# Patient Record
Sex: FEMALE | Race: WHITE | NOT HISPANIC OR LATINO | URBAN - METROPOLITAN AREA
[De-identification: names, ages, dates, MRNs, and addresses within clinical notes are randomized per-mention and may not be internally consistent; named-entity substitution may affect disease eponyms.]

---

## 2023-10-05 ENCOUNTER — HOSPITAL ENCOUNTER (EMERGENCY)
Facility: HOSPITAL | Age: 88
Discharge: STILL ADMITTED OR ADMINISTRATIVE | End: 2023-10-05

## 2023-10-05 PROCEDURE — 4500999001 HC ED NO CHARGE

## 2023-10-05 PROCEDURE — 4500999001 HC ED NO CHARGE: Performed by: EMERGENCY MEDICINE

## 2024-05-31 ENCOUNTER — NURSING HOME VISIT (OUTPATIENT)
Dept: POST ACUTE CARE | Facility: EXTERNAL LOCATION | Age: 89
End: 2024-05-31
Payer: MEDICARE

## 2024-05-31 DIAGNOSIS — I50.9 CONGESTIVE HEART FAILURE, UNSPECIFIED HF CHRONICITY, UNSPECIFIED HEART FAILURE TYPE (MULTI): Primary | ICD-10-CM

## 2024-05-31 DIAGNOSIS — R53.1 WEAKNESS: ICD-10-CM

## 2024-05-31 DIAGNOSIS — I48.91 ATRIAL FIBRILLATION, UNSPECIFIED TYPE (MULTI): ICD-10-CM

## 2024-05-31 PROCEDURE — 99308 SBSQ NF CARE LOW MDM 20: CPT | Performed by: REGISTERED NURSE

## 2024-05-31 NOTE — LETTER
Patient: Danni Tejada  : 1924    Encounter Date: 2024    PROGRESS NOTE    Subjective  Chief complaint: Danni Tejada is a 89 y.o. female who is an acute skilled patient being seen and evaluated for weakness    HPI:  24 Patient admitted to SNF for therapy d/t weakness after recent hospitalization.   Patient requires assist with ADLs and transfers.  No new complaints.      Objective  Vital signs: 130/69, 97.5, 76, 18, 98%    Physical Exam  Constitutional:       General: She is not in acute distress.  Eyes:      Extraocular Movements: Extraocular movements intact.   Pulmonary:      Effort: Pulmonary effort is normal.   Musculoskeletal:      Cervical back: Neck supple.   Neurological:      Mental Status: She is alert.   Psychiatric:         Mood and Affect: Mood normal.         Behavior: Behavior is cooperative.         Assessment/Plan  Problem List Items Addressed This Visit       CHF (congestive heart failure) (Multi) - Primary     Reduce sodium diet  Monitor for shortness of breath.  Furosemide   Monitor labs  Compression stockings            A-fib (Multi)     Monitor heart rate  Xarelto  Bleeding precautions         Weakness     Continue pt/ot           Medications, treatments, and labs reviewed  Continue medications and treatments as listed in [facility]    Scribe Attestation  I, Salima Golden   attest that this documentation has been prepared under the direction and in the presence of AMAYA Gavin.    Provider Attestation - Scribe documentation  All medical record entries made by the Scribe were at my direction and personally dictated by me. I have reviewed the chart and agree that the record accurately reflects my personal performance of the history, physical exam, discussion and plan.    AMAYA Gavin        Electronically Signed By: AMAYA Gavin   24  3:44 PM

## 2024-06-03 ENCOUNTER — NURSING HOME VISIT (OUTPATIENT)
Dept: POST ACUTE CARE | Facility: EXTERNAL LOCATION | Age: 89
End: 2024-06-03

## 2024-06-03 DIAGNOSIS — M00.812 ARTHRITIS DUE TO OTHER BACTERIA, LEFT SHOULDER (MULTI): ICD-10-CM

## 2024-06-03 DIAGNOSIS — A41.9 SEPSIS, DUE TO UNSPECIFIED ORGANISM, UNSPECIFIED WHETHER ACUTE ORGAN DYSFUNCTION PRESENT (MULTI): Primary | ICD-10-CM

## 2024-06-03 DIAGNOSIS — I50.9 CONGESTIVE HEART FAILURE, UNSPECIFIED HF CHRONICITY, UNSPECIFIED HEART FAILURE TYPE (MULTI): ICD-10-CM

## 2024-06-03 DIAGNOSIS — I48.91 ATRIAL FIBRILLATION, UNSPECIFIED TYPE (MULTI): ICD-10-CM

## 2024-06-03 DIAGNOSIS — I10 HYPERTENSION, UNSPECIFIED TYPE: ICD-10-CM

## 2024-06-03 DIAGNOSIS — E03.9 HYPOTHYROIDISM, UNSPECIFIED TYPE: ICD-10-CM

## 2024-06-03 PROBLEM — E78.5 HYPERLIPIDEMIA: Status: ACTIVE | Noted: 2024-06-03

## 2024-06-03 PROBLEM — J90 PLEURAL EFFUSION: Status: ACTIVE | Noted: 2024-06-03

## 2024-06-03 PROBLEM — R13.10 DYSPHAGIA: Status: ACTIVE | Noted: 2024-06-03

## 2024-06-03 NOTE — LETTER
Patient: Danni Tejada  : 1924    Encounter Date: 2024    HISTORY & PHYSICAL    Subjective  Chief complaint: Danni Tejada is a 89 y.o. female who is being seen and evaluated for multiple medical problems. Patient admitted to SNF for therapy due to weakness after recent hospitalization.    HPI:  HPI  Patient was admitted to the hospital and treated for septic left shoulder.  Patient was started on antibiotics and to continue outpatient.  Patient is tolerating antibiotics without adverse effects.  Therapy evaluated patient with recommendations for SNF placement and further therapy.  Patient was seen and examined at the bedside, appears to be in no acute distress.  Patient denies chest pain or shortness of breath.  Denies fever or chills.  Denies nausea or vomiting.    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, left shoulder (Multi)    • CHF (congestive heart failure) (Multi)    • Dysphagia    • Hyperlipidemia    • Hypertension    • Hypothyroidism    • Pleural effusion        Past Surgical History:   Procedure Laterality Date   • CARPAL TUNNEL RELEASE  2016    Neuroplasty Decompression Median Nerve At Carpal Tunnel       Family History   Problem Relation Name Age of Onset   • No Known Problems Mother     • No Known Problems Father         Social History     Socioeconomic History   • Marital status:      Spouse name: Not on file   • Number of children: Not on file   • Years of education: Not on file   • Highest education level: Not on file   Occupational History   • Not on file   Tobacco Use   • Smoking status: Not on file   • Smokeless tobacco: Not on file   Substance and Sexual Activity   • Alcohol use: Not on file   • Drug use: Not on file   • Sexual activity: Not on file   Other Topics Concern   • Not on file   Social History Narrative   • Not on file     Social Determinants of Health     Financial Resource Strain: Not on file   Food  Insecurity: Not on file   Transportation Needs: Not on file   Physical Activity: Not on file   Stress: Not on file   Social Connections: Not on file   Intimate Partner Violence: Not on file   Housing Stability: Not on file       Vital signs: 148/78, 70, 16, 98.0, 98%    Objective  Physical Exam  Constitutional:       General: She is not in acute distress.  Eyes:      Extraocular Movements: Extraocular movements intact.   Cardiovascular:      Rate and Rhythm: Normal rate and regular rhythm.   Pulmonary:      Effort: Pulmonary effort is normal.      Breath sounds: Normal breath sounds.   Abdominal:      General: Bowel sounds are normal.      Palpations: Abdomen is soft.   Musculoskeletal:      Cervical back: Neck supple.      Right lower leg: No edema.      Left lower leg: No edema.   Neurological:      Mental Status: She is alert.   Psychiatric:         Mood and Affect: Mood normal.         Behavior: Behavior is cooperative.         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  Furosemide         A-fib (Multi)     Monitor heart rate  Xarelto  Bleeding precautions         Hypertension     Monitor blood pressure         Hypothyroidism     Levothyroxine  Monitor TSH         Sepsis (Multi) - Primary     Continue vancomycin and ceftriaxone until complete          Hospital records reviewed  Medications, treatments, and labs reviewed  Continue medications and treatments as listed in EMR  Discussed with nursing and therapy      Scribe Attestation  I, Salima Velazquez   attest that this documentation has been prepared under the direction and in the presence of Afshin Diop MD    Provider Attestation - Scribe documentation  All medical record entries made by the Scribe were at my direction and  personally dictated by me. I have reviewed the chart and agree that the record accurately reflects my personal performance of the history, physical exam, discussion and plan.   Afshin Diop MD      Electronically Signed By: Afshin Diop MD   6/3/24  6:40 PM

## 2024-06-03 NOTE — PROGRESS NOTES
HISTORY & PHYSICAL    Subjective   Chief complaint: Danni Tejada is a 89 y.o. female who is being seen and evaluated for multiple medical problems. Patient admitted to SNF for therapy due to weakness after recent hospitalization.    HPI:  HPI  Patient was admitted to the hospital and treated for septic left shoulder.  Patient was started on antibiotics and to continue outpatient.  Patient is tolerating antibiotics without adverse effects.  Therapy evaluated patient with recommendations for SNF placement and further therapy.  Patient was seen and examined at the bedside, appears to be in no acute distress.  Patient denies chest pain or shortness of breath.  Denies fever or chills.  Denies nausea or vomiting.    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, left shoulder (Multi)     CHF (congestive heart failure) (Multi)     Dysphagia     Hyperlipidemia     Hypertension     Hypothyroidism     Pleural effusion        Past Surgical History:   Procedure Laterality Date    CARPAL TUNNEL RELEASE  06/08/2016    Neuroplasty Decompression Median Nerve At Carpal Tunnel       Family History   Problem Relation Name Age of Onset    No Known Problems Mother      No Known Problems Father         Social History     Socioeconomic History    Marital status:      Spouse name: Not on file    Number of children: Not on file    Years of education: Not on file    Highest education level: Not on file   Occupational History    Not on file   Tobacco Use    Smoking status: Not on file    Smokeless tobacco: Not on file   Substance and Sexual Activity    Alcohol use: Not on file    Drug use: Not on file    Sexual activity: Not on file   Other Topics Concern    Not on file   Social History Narrative    Not on file     Social Determinants of Health     Financial Resource Strain: Not on file   Food Insecurity: Not on file   Transportation Needs: Not on file   Physical Activity: Not on file    Stress: Not on file   Social Connections: Not on file   Intimate Partner Violence: Not on file   Housing Stability: Not on file       Vital signs: 148/78, 70, 16, 98.0, 98%    Objective   Physical Exam  Constitutional:       General: She is not in acute distress.  Eyes:      Extraocular Movements: Extraocular movements intact.   Cardiovascular:      Rate and Rhythm: Normal rate and regular rhythm.   Pulmonary:      Effort: Pulmonary effort is normal.      Breath sounds: Normal breath sounds.   Abdominal:      General: Bowel sounds are normal.      Palpations: Abdomen is soft.   Musculoskeletal:      Cervical back: Neck supple.      Right lower leg: No edema.      Left lower leg: No edema.   Neurological:      Mental Status: She is alert.   Psychiatric:         Mood and Affect: Mood normal.         Behavior: Behavior is cooperative.         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  Furosemide         A-fib (Multi)     Monitor heart rate  Xarelto  Bleeding precautions         Hypertension     Monitor blood pressure         Hypothyroidism     Levothyroxine  Monitor TSH         Sepsis (Multi) - Primary     Continue vancomycin and ceftriaxone until complete          Hospital records reviewed  Medications, treatments, and labs reviewed  Continue medications and treatments as listed in EMR  Discussed with nursing and therapy      Scribe Attestation  I, Salima Velazquez   attest that this documentation has been prepared under the direction and in the presence of Afshin Diop MD    Provider Attestation - Scribe documentation  All medical record entries made by the Scribe were at my direction and personally dictated by me. I have reviewed the chart and agree that the record accurately reflects  my personal performance of the history, physical exam, discussion and plan.   Afshin Diop MD

## 2024-06-04 ENCOUNTER — NURSING HOME VISIT (OUTPATIENT)
Dept: POST ACUTE CARE | Facility: EXTERNAL LOCATION | Age: 89
End: 2024-06-04

## 2024-06-04 DIAGNOSIS — M00.812 ARTHRITIS DUE TO OTHER BACTERIA, LEFT SHOULDER (MULTI): ICD-10-CM

## 2024-06-04 DIAGNOSIS — I50.9 CONGESTIVE HEART FAILURE, UNSPECIFIED HF CHRONICITY, UNSPECIFIED HEART FAILURE TYPE (MULTI): ICD-10-CM

## 2024-06-04 DIAGNOSIS — I10 HYPERTENSION, UNSPECIFIED TYPE: ICD-10-CM

## 2024-06-04 DIAGNOSIS — A41.9 SEPSIS, DUE TO UNSPECIFIED ORGANISM, UNSPECIFIED WHETHER ACUTE ORGAN DYSFUNCTION PRESENT (MULTI): ICD-10-CM

## 2024-06-04 DIAGNOSIS — I48.91 ATRIAL FIBRILLATION, UNSPECIFIED TYPE (MULTI): ICD-10-CM

## 2024-06-04 NOTE — PROGRESS NOTES
PROGRESS NOTE    Subjective   Chief complaint: Danni Tejada is a 89 y.o. female who is an acute skilled patient being seen and evaluated for weakness    HPI:  Patient presents for general medical care and f/u.  Patient seen and examined at bedside. HTN, denies fatigue, sob, and palpitations. Afib, denies palpitation or chest pain. CHF, no changes in weight or SOB. She continues on IV ATB therapy for septic shoulder. WBC increased from 7 to 12. H&H 9.5 & 29.1. CR 0.6.  No issues per nursing.  Patient has no acute complaints.      Objective   Vital signs: 122/74,70,99%    Physical Exam  Constitutional:       General: She is not in acute distress.  Eyes:      Extraocular Movements: Extraocular movements intact.   Cardiovascular:      Rate and Rhythm: Normal rate and regular rhythm.   Pulmonary:      Effort: Pulmonary effort is normal.      Breath sounds: Normal breath sounds.   Abdominal:      General: Bowel sounds are normal.      Palpations: Abdomen is soft.   Musculoskeletal:      Cervical back: Neck supple.      Right lower leg: No edema.      Left lower leg: No edema.   Neurological:      Mental Status: She is alert.   Psychiatric:         Mood and Affect: Mood normal.         Behavior: Behavior is cooperative.         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  Furosemide         A-fib (Multi)     Monitor heart rate  Xarelto  Bleeding precautions         Hypertension     Monitor blood pressure         Sepsis (Multi)     Continue vancomycin and ceftriaxone until complete          Medications, treatments, and labs reviewed  Continue medications and treatments as listed in EMR    Scribe Attestation  I, Salima Martinez   attest that this documentation has been prepared under the  direction and in the presence of Afshin Diop MD.     Provider Attestation - Scribe documentation  All medical record entries made by the Scribe were at my direction and personally dictated by me. I have reviewed the chart and agree that the record accurately reflects my personal performance of the history, physical exam, discussion and plan.   Afshin Diop MD

## 2024-06-04 NOTE — ASSESSMENT & PLAN NOTE
Reduce sodium diet  Monitor for shortness of breath.  Increase lasix to 80mg x 3 days then 60mg  BMP in 1 week  Continue K 10 Meq  CBC in AM  Compression stockings

## 2024-06-04 NOTE — LETTER
Patient: Danni Tejada  : 1924    Encounter Date: 2024    PROGRESS NOTE    Subjective  Chief complaint: Danni Tejada is a 89 y.o. female who is an acute skilled patient being seen and evaluated for weakness    HPI:  Patient presents for general medical care and f/u.  Patient seen and examined at bedside. HTN, denies fatigue, sob, and palpitations. Afib, denies palpitation or chest pain. CHF, no changes in weight or SOB. She continues on IV ATB therapy for septic shoulder. WBC increased from 7 to 12. H&H 9.5 & 29.1. CR 0.6.  No issues per nursing.  Patient has no acute complaints.      Objective  Vital signs: 122/74,70,99%    Physical Exam  Constitutional:       General: She is not in acute distress.  Eyes:      Extraocular Movements: Extraocular movements intact.   Cardiovascular:      Rate and Rhythm: Normal rate and regular rhythm.   Pulmonary:      Effort: Pulmonary effort is normal.      Breath sounds: Normal breath sounds.   Abdominal:      General: Bowel sounds are normal.      Palpations: Abdomen is soft.   Musculoskeletal:      Cervical back: Neck supple.      Right lower leg: No edema.      Left lower leg: No edema.   Neurological:      Mental Status: She is alert.   Psychiatric:         Mood and Affect: Mood normal.         Behavior: Behavior is cooperative.         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  Furosemide         A-fib (Multi)     Monitor heart rate  Xarelto  Bleeding precautions         Hypertension     Monitor blood pressure         Sepsis (Multi)     Continue vancomycin and ceftriaxone until complete          Medications, treatments, and labs reviewed  Continue medications and treatments as listed in EMR    Scribe Attestation  I, Laura Toro,  Scribe   attest that this documentation has been prepared under the direction and in the presence of Afshin Diop MD.     Provider Attestation - Scribe documentation  All medical record entries made by the Scribe were at my direction and personally dictated by me. I have reviewed the chart and agree that the record accurately reflects my personal performance of the history, physical exam, discussion and plan.   Afshin Diop MD      Electronically Signed By: Afshin Diop MD   6/4/24  5:13 PM

## 2024-06-06 ENCOUNTER — NURSING HOME VISIT (OUTPATIENT)
Dept: POST ACUTE CARE | Facility: EXTERNAL LOCATION | Age: 89
End: 2024-06-06

## 2024-06-06 DIAGNOSIS — A41.9 SEPSIS, DUE TO UNSPECIFIED ORGANISM, UNSPECIFIED WHETHER ACUTE ORGAN DYSFUNCTION PRESENT (MULTI): Primary | ICD-10-CM

## 2024-06-06 DIAGNOSIS — I10 HYPERTENSION, UNSPECIFIED TYPE: ICD-10-CM

## 2024-06-06 DIAGNOSIS — I48.91 ATRIAL FIBRILLATION, UNSPECIFIED TYPE (MULTI): ICD-10-CM

## 2024-06-06 DIAGNOSIS — M00.812 ARTHRITIS DUE TO OTHER BACTERIA, LEFT SHOULDER (MULTI): ICD-10-CM

## 2024-06-06 DIAGNOSIS — I50.9 CONGESTIVE HEART FAILURE, UNSPECIFIED HF CHRONICITY, UNSPECIFIED HEART FAILURE TYPE (MULTI): ICD-10-CM

## 2024-06-06 PROBLEM — R53.1 WEAKNESS: Status: ACTIVE | Noted: 2024-06-06

## 2024-06-06 NOTE — LETTER
Patient: Danni Tejada  : 1924    Encounter Date: 2024    PROGRESS NOTE    Subjective  Chief complaint: Danni Tejada is a 89 y.o. female who is an acute skilled patient being seen and evaluated for weakness    HPI:  HPI  Patient is continue to work in therapy.  Patient is working on therapeutic exercise and activities, neuromuscular education, patient education, ADLs and self-care.  Patient is working towards goals.  Patient was seen and examined at the bedside.  Patient's Lasix was increased to 60 mg daily and to have a BMP in 1 week.  Patient also started compression stockings.  Patient continues on antibiotics for sepsis, tolerating without adverse effects    Objective  Vital signs: 146/76, 75, 17, 95%    Physical Exam  Constitutional:       General: She is not in acute distress.  Eyes:      Extraocular Movements: Extraocular movements intact.   Cardiovascular:      Rate and Rhythm: Normal rate and regular rhythm.   Pulmonary:      Effort: Pulmonary effort is normal.      Breath sounds: Normal breath sounds.   Abdominal:      General: Bowel sounds are normal.      Palpations: Abdomen is soft.   Musculoskeletal:      Cervical back: Neck supple.      Right lower leg: No edema.      Left lower leg: No edema.   Neurological:      Mental Status: She is alert.      Motor: Weakness present.   Psychiatric:         Mood and Affect: Mood normal.         Behavior: Behavior is cooperative.         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  Furosemide increased  BMP in 1 week  Compression stockings           A-fib (Multi)     Monitor heart rate  Xarelto  Bleeding precautions         Hypertension     Monitor blood pressure         Sepsis (Multi) - Primary     Continue vancomycin and  ceftriaxone until complete          Medications, treatments, and labs reviewed  Continue medications and treatments as listed in EMR      Scribe Attestation  I, Salima Velazquez   attest that this documentation has been prepared under the direction and in the presence of Afshin Diop MD    Provider Attestation - Scribe documentation  All medical record entries made by the Scribe were at my direction and personally dictated by me. I have reviewed the chart and agree that the record accurately reflects my personal performance of the history, physical exam, discussion and plan.   Afshin Diop MD        Electronically Signed By: Afshin Diop MD   6/6/24  1:33 PM

## 2024-06-06 NOTE — ASSESSMENT & PLAN NOTE
Reduce sodium diet  Monitor for shortness of breath.  Furosemide increased  BMP in 1 week  Compression stockings

## 2024-06-06 NOTE — PROGRESS NOTES
PROGRESS NOTE    Subjective   Chief complaint: Danni Tejada is a 89 y.o. female who is an acute skilled patient being seen and evaluated for weakness    HPI:  HPI  Patient is continue to work in therapy.  Patient is working on therapeutic exercise and activities, neuromuscular education, patient education, ADLs and self-care.  Patient is working towards goals.  Patient was seen and examined at the bedside.  Patient's Lasix was increased to 60 mg daily and to have a BMP in 1 week.  Patient also started compression stockings.  Patient continues on antibiotics for sepsis, tolerating without adverse effects    Objective   Vital signs: 146/76, 75, 17, 95%    Physical Exam  Constitutional:       General: She is not in acute distress.  Eyes:      Extraocular Movements: Extraocular movements intact.   Cardiovascular:      Rate and Rhythm: Normal rate and regular rhythm.   Pulmonary:      Effort: Pulmonary effort is normal.      Breath sounds: Normal breath sounds.   Abdominal:      General: Bowel sounds are normal.      Palpations: Abdomen is soft.   Musculoskeletal:      Cervical back: Neck supple.      Right lower leg: No edema.      Left lower leg: No edema.   Neurological:      Mental Status: She is alert.      Motor: Weakness present.   Psychiatric:         Mood and Affect: Mood normal.         Behavior: Behavior is cooperative.         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  Furosemide increased  BMP in 1 week  Compression stockings           A-fib (Multi)     Monitor heart rate  Xarelto  Bleeding precautions         Hypertension     Monitor blood pressure         Sepsis (Multi) - Primary     Continue vancomycin and ceftriaxone until complete          Medications, treatments, and labs  reviewed  Continue medications and treatments as listed in EMR      Scribe Attestation  I, Salima Velazquez   attest that this documentation has been prepared under the direction and in the presence of Afshin Diop MD    Provider Attestation - Scribe documentation  All medical record entries made by the Scribe were at my direction and personally dictated by me. I have reviewed the chart and agree that the record accurately reflects my personal performance of the history, physical exam, discussion and plan.   Afshin Diop MD

## 2024-06-10 ENCOUNTER — NURSING HOME VISIT (OUTPATIENT)
Dept: POST ACUTE CARE | Facility: EXTERNAL LOCATION | Age: 89
End: 2024-06-10

## 2024-06-10 DIAGNOSIS — R53.1 WEAKNESS: ICD-10-CM

## 2024-06-10 DIAGNOSIS — M00.812 ARTHRITIS DUE TO OTHER BACTERIA, LEFT SHOULDER (MULTI): ICD-10-CM

## 2024-06-10 DIAGNOSIS — I50.9 CONGESTIVE HEART FAILURE, UNSPECIFIED HF CHRONICITY, UNSPECIFIED HEART FAILURE TYPE (MULTI): ICD-10-CM

## 2024-06-10 DIAGNOSIS — I10 HYPERTENSION, UNSPECIFIED TYPE: ICD-10-CM

## 2024-06-10 DIAGNOSIS — I48.91 ATRIAL FIBRILLATION, UNSPECIFIED TYPE (MULTI): ICD-10-CM

## 2024-06-10 NOTE — LETTER
Patient: Danni Tejada  : 1924    Encounter Date: 06/10/2024    PROGRESS NOTE    Subjective  Chief complaint: Danni Tejada is a 89 y.o. female who is an acute skilled patient being seen and evaluated for weakness    HPI:  Patient presents for f/u therapy and general medical care.  Patient seen and examined at beside.  Therapy has been working with the patient to improve strength, endurance, ADLs, and transfers d/t generalized weakness.  Patient has no acute concerns today.       Objective  Vital signs: 130/69,76,98%    Physical Exam  Constitutional:       General: She is not in acute distress.  Eyes:      Extraocular Movements: Extraocular movements intact.   Cardiovascular:      Rate and Rhythm: Normal rate and regular rhythm.   Pulmonary:      Effort: Pulmonary effort is normal.      Breath sounds: Normal breath sounds.   Abdominal:      General: Bowel sounds are normal.      Palpations: Abdomen is soft.   Musculoskeletal:      Cervical back: Neck supple.      Right lower leg: No edema.      Left lower leg: No edema.   Neurological:      Mental Status: She is alert.      Motor: Weakness present.   Psychiatric:         Mood and Affect: Mood normal.         Behavior: Behavior is cooperative.         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  Furosemide 60mg  Compression stockings           A-fib (Multi)     Monitor heart rate  Xarelto  Bleeding precautions         Hypertension     Monitor blood pressure         Weakness     Continue to work towards goals in therapy          Medications, treatments, and labs reviewed  Continue medications and treatments as listed in EMR    Scribe Attestation  I, Salima Martinez   attest that this  documentation has been prepared under the direction and in the presence of Afshin Diop MD.     Provider Attestation - Scribe documentation  All medical record entries made by the Scribe were at my direction and personally dictated by me. I have reviewed the chart and agree that the record accurately reflects my personal performance of the history, physical exam, discussion and plan.   Afshin Diop MD      Electronically Signed By: Afshin Diop MD   6/10/24  3:45 PM

## 2024-06-10 NOTE — PROGRESS NOTES
PROGRESS NOTE    Subjective   Chief complaint: Danni Tejada is a 89 y.o. female who is an acute skilled patient being seen and evaluated for weakness    HPI:  5/31/24 Patient admitted to SNF for therapy d/t weakness after recent hospitalization.   Patient requires assist with ADLs and transfers.  No new complaints.      Objective   Vital signs: 130/69, 97.5, 76, 18, 98%    Physical Exam  Constitutional:       General: She is not in acute distress.  Eyes:      Extraocular Movements: Extraocular movements intact.   Pulmonary:      Effort: Pulmonary effort is normal.   Musculoskeletal:      Cervical back: Neck supple.   Neurological:      Mental Status: She is alert.   Psychiatric:         Mood and Affect: Mood normal.         Behavior: Behavior is cooperative.         Assessment/Plan   Problem List Items Addressed This Visit       CHF (congestive heart failure) (Multi) - Primary     Reduce sodium diet  Monitor for shortness of breath.  Furosemide   Monitor labs  Compression stockings            A-fib (Multi)     Monitor heart rate  Xarelto  Bleeding precautions         Weakness     Continue pt/ot           Medications, treatments, and labs reviewed  Continue medications and treatments as listed in PCC    Scribe Attestation  I, Salima Golden   attest that this documentation has been prepared under the direction and in the presence of AMAYA Gavin.    Provider Attestation - Scribe documentation  All medical record entries made by the Scribe were at my direction and personally dictated by me. I have reviewed the chart and agree that the record accurately reflects my personal performance of the history, physical exam, discussion and plan.    AMAYA Gavni

## 2024-06-10 NOTE — PROGRESS NOTES
PROGRESS NOTE    Subjective   Chief complaint: Danni Tejada is a 89 y.o. female who is an acute skilled patient being seen and evaluated for weakness    HPI:  Patient presents for f/u therapy and general medical care.  Patient seen and examined at beside.  Therapy has been working with the patient to improve strength, endurance, ADLs, and transfers d/t generalized weakness.  Patient has no acute concerns today.       Objective   Vital signs: 130/69,76,98%    Physical Exam  Constitutional:       General: She is not in acute distress.  Eyes:      Extraocular Movements: Extraocular movements intact.   Cardiovascular:      Rate and Rhythm: Normal rate and regular rhythm.   Pulmonary:      Effort: Pulmonary effort is normal.      Breath sounds: Normal breath sounds.   Abdominal:      General: Bowel sounds are normal.      Palpations: Abdomen is soft.   Musculoskeletal:      Cervical back: Neck supple.      Right lower leg: No edema.      Left lower leg: No edema.   Neurological:      Mental Status: She is alert.      Motor: Weakness present.   Psychiatric:         Mood and Affect: Mood normal.         Behavior: Behavior is cooperative.         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  Furosemide 60mg  Compression stockings           A-fib (Multi)     Monitor heart rate  Xarelto  Bleeding precautions         Hypertension     Monitor blood pressure         Weakness     Continue to work towards goals in therapy          Medications, treatments, and labs reviewed  Continue medications and treatments as listed in EMR    Scribe Attestation  CELIA, Salima Martinez   attest that this documentation has been prepared under the direction and in the presence of  Afshin Diop MD.     Provider Attestation - Scribe documentation  All medical record entries made by the Scribe were at my direction and personally dictated by me. I have reviewed the chart and agree that the record accurately reflects my personal performance of the history, physical exam, discussion and plan.   Afshin Diop MD

## 2024-06-11 ENCOUNTER — NURSING HOME VISIT (OUTPATIENT)
Dept: POST ACUTE CARE | Facility: EXTERNAL LOCATION | Age: 89
End: 2024-06-11

## 2024-06-11 DIAGNOSIS — I50.9 CONGESTIVE HEART FAILURE, UNSPECIFIED HF CHRONICITY, UNSPECIFIED HEART FAILURE TYPE (MULTI): ICD-10-CM

## 2024-06-11 DIAGNOSIS — I10 HYPERTENSION, UNSPECIFIED TYPE: ICD-10-CM

## 2024-06-11 DIAGNOSIS — R53.1 WEAKNESS: ICD-10-CM

## 2024-06-11 DIAGNOSIS — I48.91 ATRIAL FIBRILLATION, UNSPECIFIED TYPE (MULTI): ICD-10-CM

## 2024-06-11 DIAGNOSIS — M00.812 ARTHRITIS DUE TO OTHER BACTERIA, LEFT SHOULDER (MULTI): ICD-10-CM

## 2024-06-11 NOTE — PROGRESS NOTES
PROGRESS NOTE    Subjective   Chief complaint: Danni Tejada is a 89 y.o. female who is an acute skilled patient being seen and evaluated for weakness    HPI:  Patient presents for f/u therapy and general medical care.  Patient seen and examined at beside.  Therapy has been working with the patient to improve strength, endurance, ADLs, and transfers d/t generalized weakness. She continues with BLE edema. States that during shower she acquired ST to LLE, drsg and treatment in place.  Patient has no acute concerns today.      Objective   Vital signs: 129/71,73,97%    Physical Exam  Constitutional:       General: She is not in acute distress.  Eyes:      Extraocular Movements: Extraocular movements intact.   Cardiovascular:      Rate and Rhythm: Normal rate and regular rhythm.   Pulmonary:      Effort: Pulmonary effort is normal.      Breath sounds: Normal breath sounds.   Abdominal:      General: Bowel sounds are normal.      Palpations: Abdomen is soft.   Musculoskeletal:      Cervical back: Neck supple.      Right lower leg: Edema present.      Left lower leg: Edema present.   Skin:     Comments: LLE ST- drsg in place   Neurological:      Mental Status: She is alert.      Motor: Weakness present.   Psychiatric:         Mood and Affect: Mood normal.         Behavior: Behavior is cooperative.         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  Furosemide 60mg  Compression stockings  Elevation  US to BLE for edema           A-fib (Multi)     Monitor heart rate  Xarelto  Bleeding precautions         Hypertension     Monitor blood pressure         Weakness     Continue to work towards goals in therapy          Medications, treatments, and labs reviewed  Continue  medications and treatments as listed in EMR    Scribe Attestation  I, Salima Martinez   attest that this documentation has been prepared under the direction and in the presence of Afshin Diop MD.     Provider Attestation - Scribe documentation  All medical record entries made by the Scribe were at my direction and personally dictated by me. I have reviewed the chart and agree that the record accurately reflects my personal performance of the history, physical exam, discussion and plan.   Afshin Diop MD

## 2024-06-11 NOTE — ASSESSMENT & PLAN NOTE
Reduce sodium diet  Monitor for shortness of breath.  Furosemide 60mg  Compression stockings  Elevation  US to BLE for edema

## 2024-06-11 NOTE — LETTER
Patient: Danni Tejada  : 1924    Encounter Date: 2024    PROGRESS NOTE    Subjective  Chief complaint: Danni Tejada is a 89 y.o. female who is an acute skilled patient being seen and evaluated for weakness    HPI:  Patient presents for f/u therapy and general medical care.  Patient seen and examined at beside.  Therapy has been working with the patient to improve strength, endurance, ADLs, and transfers d/t generalized weakness. She continues with BLE edema. States that during shower she acquired ST to LLE, drsg and treatment in place.  Patient has no acute concerns today.      Objective  Vital signs: 129/71,73,97%    Physical Exam  Constitutional:       General: She is not in acute distress.  Eyes:      Extraocular Movements: Extraocular movements intact.   Cardiovascular:      Rate and Rhythm: Normal rate and regular rhythm.   Pulmonary:      Effort: Pulmonary effort is normal.      Breath sounds: Normal breath sounds.   Abdominal:      General: Bowel sounds are normal.      Palpations: Abdomen is soft.   Musculoskeletal:      Cervical back: Neck supple.      Right lower leg: Edema present.      Left lower leg: Edema present.   Skin:     Comments: LLE ST- drsg in place   Neurological:      Mental Status: She is alert.      Motor: Weakness present.   Psychiatric:         Mood and Affect: Mood normal.         Behavior: Behavior is cooperative.         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  Furosemide 60mg  Compression stockings  Elevation  US to BLE for edema           A-fib (Multi)     Monitor heart rate  Xarelto  Bleeding precautions         Hypertension     Monitor blood pressure         Weakness     Continue to work towards goals in  therapy          Medications, treatments, and labs reviewed  Continue medications and treatments as listed in EMR    Scribe Attestation  I, Salima Martinez   attest that this documentation has been prepared under the direction and in the presence of Afshin Diop MD.     Provider Attestation - Scribe documentation  All medical record entries made by the Scribe were at my direction and personally dictated by me. I have reviewed the chart and agree that the record accurately reflects my personal performance of the history, physical exam, discussion and plan.   Afshin Diop MD      Electronically Signed By: Afshin Diop MD   6/11/24  2:28 PM

## 2024-06-13 ENCOUNTER — NURSING HOME VISIT (OUTPATIENT)
Dept: POST ACUTE CARE | Facility: EXTERNAL LOCATION | Age: 89
End: 2024-06-13

## 2024-06-13 DIAGNOSIS — I50.9 CONGESTIVE HEART FAILURE, UNSPECIFIED HF CHRONICITY, UNSPECIFIED HEART FAILURE TYPE (MULTI): ICD-10-CM

## 2024-06-13 DIAGNOSIS — M00.812 ARTHRITIS DUE TO OTHER BACTERIA, LEFT SHOULDER (MULTI): Primary | ICD-10-CM

## 2024-06-13 DIAGNOSIS — R53.1 WEAKNESS: ICD-10-CM

## 2024-06-13 DIAGNOSIS — I48.91 ATRIAL FIBRILLATION, UNSPECIFIED TYPE (MULTI): ICD-10-CM

## 2024-06-13 DIAGNOSIS — I10 HYPERTENSION, UNSPECIFIED TYPE: ICD-10-CM

## 2024-06-13 NOTE — PROGRESS NOTES
PROGRESS NOTE    Subjective   Chief complaint: Danni Tejada is a 89 y.o. female who is an acute skilled patient being seen and evaluated for weakness    HPI:  HPI  Patient does continue to work in therapy following left shoulder infection.  Patient does continue on antibiotics, tolerating without adverse effects.  Patient is performing transfers requiring SBA from various surfaces.  Patient is ambulating up to 300 feet in therapy.  Patient is also working on ADL retraining.  Patient seen and examined at the bedside, appears to be in no acute distress.  Denies fever or chills.  Denies nausea or vomiting.    Objective   Vital signs: 116/76, 76, 14, 98.0, 98%    Physical Exam  Constitutional:       General: She is not in acute distress.  Eyes:      Extraocular Movements: Extraocular movements intact.   Cardiovascular:      Rate and Rhythm: Normal rate and regular rhythm.   Pulmonary:      Effort: Pulmonary effort is normal.      Breath sounds: Normal breath sounds.   Abdominal:      General: Bowel sounds are normal.      Palpations: Abdomen is soft.   Musculoskeletal:      Cervical back: Neck supple.      Right lower leg: Edema present.      Left lower leg: Edema present.   Skin:     Comments: LLE ST- drsg in place   Neurological:      Mental Status: She is alert.      Motor: Weakness present.   Psychiatric:         Mood and Affect: Mood normal.         Behavior: Behavior is cooperative.         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  Furosemide   Monitor labs  Compression stockings           A-fib (Multi)     Monitor heart rate  Xarelto  Bleeding precautions         Hypertension     Monitor blood pressure         Weakness     Continue to work towards established goals           Medications, treatments, and labs reviewed  Continue medications and treatments as listed in EMR      Scribe Attestation  I, Salima Velazquez   attest that this documentation has been prepared under the direction and in the presence of Afshin Diop MD    Provider Attestation - Scribe documentation  All medical record entries made by the Scribe were at my direction and personally dictated by me. I have reviewed the chart and agree that the record accurately reflects my personal performance of the history, physical exam, discussion and plan.   Afshin Diop MD

## 2024-06-13 NOTE — ASSESSMENT & PLAN NOTE
Reduce sodium diet  Monitor for shortness of breath.  Furosemide   Monitor labs  Compression stockings

## 2024-06-13 NOTE — LETTER
Patient: Danni Tejada  : 1924    Encounter Date: 2024    PROGRESS NOTE    Subjective  Chief complaint: Danni Tejada is a 89 y.o. female who is an acute skilled patient being seen and evaluated for weakness    HPI:  HPI  Patient does continue to work in therapy following left shoulder infection.  Patient does continue on antibiotics, tolerating without adverse effects.  Patient is performing transfers requiring SBA from various surfaces.  Patient is ambulating up to 300 feet in therapy.  Patient is also working on ADL retraining.  Patient seen and examined at the bedside, appears to be in no acute distress.  Denies fever or chills.  Denies nausea or vomiting.    Objective  Vital signs: 116/76, 76, 14, 98.0, 98%    Physical Exam  Constitutional:       General: She is not in acute distress.  Eyes:      Extraocular Movements: Extraocular movements intact.   Cardiovascular:      Rate and Rhythm: Normal rate and regular rhythm.   Pulmonary:      Effort: Pulmonary effort is normal.      Breath sounds: Normal breath sounds.   Abdominal:      General: Bowel sounds are normal.      Palpations: Abdomen is soft.   Musculoskeletal:      Cervical back: Neck supple.      Right lower leg: Edema present.      Left lower leg: Edema present.   Skin:     Comments: LLE ST- drsg in place   Neurological:      Mental Status: She is alert.      Motor: Weakness present.   Psychiatric:         Mood and Affect: Mood normal.         Behavior: Behavior is cooperative.         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  Furosemide   Monitor labs  Compression stockings           A-fib (Multi)     Monitor heart rate  Xarelto  Bleeding precautions         Hypertension     Monitor blood pressure          Weakness     Continue to work towards established goals          Medications, treatments, and labs reviewed  Continue medications and treatments as listed in EMR      Scribe Attestation  I, Salima Velazquez   attest that this documentation has been prepared under the direction and in the presence of Afshin Diop MD    Provider Attestation - Scribe documentation  All medical record entries made by the Scribe were at my direction and personally dictated by me. I have reviewed the chart and agree that the record accurately reflects my personal performance of the history, physical exam, discussion and plan.   Afshin Diop MD        Electronically Signed By: Afshin Diop MD   6/13/24  2:19 PM

## 2024-06-17 ENCOUNTER — NURSING HOME VISIT (OUTPATIENT)
Dept: POST ACUTE CARE | Facility: EXTERNAL LOCATION | Age: 89
End: 2024-06-17
Payer: MEDICARE

## 2024-06-17 DIAGNOSIS — R53.1 WEAKNESS: ICD-10-CM

## 2024-06-17 DIAGNOSIS — I10 HYPERTENSION, UNSPECIFIED TYPE: ICD-10-CM

## 2024-06-17 DIAGNOSIS — I50.9 CONGESTIVE HEART FAILURE, UNSPECIFIED HF CHRONICITY, UNSPECIFIED HEART FAILURE TYPE (MULTI): ICD-10-CM

## 2024-06-17 DIAGNOSIS — I48.91 ATRIAL FIBRILLATION, UNSPECIFIED TYPE (MULTI): ICD-10-CM

## 2024-06-17 DIAGNOSIS — M00.812 ARTHRITIS DUE TO OTHER BACTERIA, LEFT SHOULDER (MULTI): Primary | ICD-10-CM

## 2024-06-17 PROCEDURE — 99308 SBSQ NF CARE LOW MDM 20: CPT | Performed by: INTERNAL MEDICINE

## 2024-06-17 NOTE — PROGRESS NOTES
PROGRESS NOTE    Subjective   Chief complaint: Danni Tejada is a 89 y.o. female who is an acute skilled patient being seen and evaluated for weakness    HPI:  Patient at SNF for skilled nursing and IV ATB  therapy management. She continues with no adverse reactions noted. Following up on BLE edema which has improved. Lungs are clear. Nursing reports no new concerns.       Objective   Vital signs: 128/76,76,99%    Physical Exam  Constitutional:       General: She is not in acute distress.  Eyes:      Extraocular Movements: Extraocular movements intact.   Cardiovascular:      Rate and Rhythm: Normal rate and regular rhythm.   Pulmonary:      Effort: Pulmonary effort is normal.      Breath sounds: Normal breath sounds.   Abdominal:      General: Bowel sounds are normal.      Palpations: Abdomen is soft.   Musculoskeletal:      Cervical back: Neck supple.      Right lower leg: Edema present.      Left lower leg: Edema present.   Skin:     Comments: LLE ST- drsg in place   Neurological:      Mental Status: She is alert.      Motor: Weakness present.   Psychiatric:         Mood and Affect: Mood normal.         Behavior: Behavior is cooperative.         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  Furosemide   Monitor labs  Compression stockings           A-fib (Multi)     Monitor heart rate  Xarelto  Bleeding precautions         Hypertension     Monitor blood pressure         Weakness     Continue to work towards established goals          Medications, treatments, and labs reviewed  Continue medications and treatments as listed in EMR    Scribe Attestation  CELIA, Salima Martinez   attest that this documentation has been prepared under the direction and in the presence of Afshin BROUSSARD  MD Chikis.     Provider Attestation - Scribe documentation  All medical record entries made by the Scribe were at my direction and personally dictated by me. I have reviewed the chart and agree that the record accurately reflects my personal performance of the history, physical exam, discussion and plan.   Afshin Diop MD

## 2024-06-17 NOTE — LETTER
Patient: Danni Tejada  : 1924    Encounter Date: 2024    PROGRESS NOTE    Subjective  Chief complaint: Danni Tejada is a 89 y.o. female who is an acute skilled patient being seen and evaluated for weakness    HPI:  Patient at SNF for skilled nursing and IV ATB  therapy management. She continues with no adverse reactions noted. Following up on BLE edema which has improved. Lungs are clear. Nursing reports no new concerns.       Objective  Vital signs: 128/76,76,99%    Physical Exam  Constitutional:       General: She is not in acute distress.  Eyes:      Extraocular Movements: Extraocular movements intact.   Cardiovascular:      Rate and Rhythm: Normal rate and regular rhythm.   Pulmonary:      Effort: Pulmonary effort is normal.      Breath sounds: Normal breath sounds.   Abdominal:      General: Bowel sounds are normal.      Palpations: Abdomen is soft.   Musculoskeletal:      Cervical back: Neck supple.      Right lower leg: Edema present.      Left lower leg: Edema present.   Skin:     Comments: LLE ST- drsg in place   Neurological:      Mental Status: She is alert.      Motor: Weakness present.   Psychiatric:         Mood and Affect: Mood normal.         Behavior: Behavior is cooperative.         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  Furosemide   Monitor labs  Compression stockings           A-fib (Multi)     Monitor heart rate  Xarelto  Bleeding precautions         Hypertension     Monitor blood pressure         Weakness     Continue to work towards established goals          Medications, treatments, and labs reviewed  Continue medications and treatments as listed in EMR    Scribe Attestation  I, Salima Martinez   attest that this documentation has  been prepared under the direction and in the presence of Afshin Diop MD.     Provider Attestation - Scribe documentation  All medical record entries made by the Scribe were at my direction and personally dictated by me. I have reviewed the chart and agree that the record accurately reflects my personal performance of the history, physical exam, discussion and plan.   Afshin Diop MD      Electronically Signed By: Afshin Diop MD   6/17/24  1:55 PM

## 2024-06-18 ENCOUNTER — NURSING HOME VISIT (OUTPATIENT)
Dept: POST ACUTE CARE | Facility: EXTERNAL LOCATION | Age: 89
End: 2024-06-18
Payer: MEDICARE

## 2024-06-18 DIAGNOSIS — R53.1 WEAKNESS: ICD-10-CM

## 2024-06-18 DIAGNOSIS — I48.91 ATRIAL FIBRILLATION, UNSPECIFIED TYPE (MULTI): ICD-10-CM

## 2024-06-18 DIAGNOSIS — A41.9 SEPSIS, DUE TO UNSPECIFIED ORGANISM, UNSPECIFIED WHETHER ACUTE ORGAN DYSFUNCTION PRESENT (MULTI): ICD-10-CM

## 2024-06-18 DIAGNOSIS — I50.9 CONGESTIVE HEART FAILURE, UNSPECIFIED HF CHRONICITY, UNSPECIFIED HEART FAILURE TYPE (MULTI): ICD-10-CM

## 2024-06-18 DIAGNOSIS — I10 HYPERTENSION, UNSPECIFIED TYPE: ICD-10-CM

## 2024-06-18 DIAGNOSIS — M00.812 ARTHRITIS DUE TO OTHER BACTERIA, LEFT SHOULDER (MULTI): Primary | ICD-10-CM

## 2024-06-18 PROCEDURE — 99308 SBSQ NF CARE LOW MDM 20: CPT | Performed by: INTERNAL MEDICINE

## 2024-06-18 NOTE — PROGRESS NOTES
PROGRESS NOTE    Subjective   Chief complaint: Danni Tejada is a 89 y.o. female who is an acute skilled patient being seen and evaluated for weakness    HPI:  Patient continues at SNF for nursing management of IV ATB therapy. No adverse reactions noted, remains afebrile. Nursing reports no new concerns. Denies pain\discomfort.       Objective   Vital signs: 139/69,74,97%    Physical Exam  Constitutional:       General: She is not in acute distress.  Eyes:      Extraocular Movements: Extraocular movements intact.   Cardiovascular:      Rate and Rhythm: Normal rate and regular rhythm.   Pulmonary:      Effort: Pulmonary effort is normal.      Breath sounds: Normal breath sounds.   Abdominal:      General: Bowel sounds are normal.      Palpations: Abdomen is soft.   Musculoskeletal:      Cervical back: Neck supple.      Right lower leg: Edema present.      Left lower leg: Edema present.   Skin:     Comments: LLE ST- drsg in place   Neurological:      Mental Status: She is alert.      Motor: Weakness present.   Psychiatric:         Mood and Affect: Mood normal.         Behavior: Behavior is cooperative.         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  Furosemide   Monitor labs  Compression stockings           A-fib (Multi)     Monitor heart rate  Xarelto  Bleeding precautions         Hypertension     Monitor blood pressure         Sepsis (Multi)     Continue vancomycin and ceftriaxone until complete         Weakness     Continue to work towards established goals          Medications, treatments, and labs reviewed  Continue medications and treatments as listed in EMR    Scribe Attestation  CELIA, Salima Martinez   attest that this documentation has been prepared under the  direction and in the presence of Afshin Diop MD.     Provider Attestation - Scribe documentation  All medical record entries made by the Scribe were at my direction and personally dictated by me. I have reviewed the chart and agree that the record accurately reflects my personal performance of the history, physical exam, discussion and plan.   Afshin Diop MD

## 2024-06-18 NOTE — LETTER
Patient: Danni Tejada  : 1924    Encounter Date: 2024    PROGRESS NOTE    Subjective  Chief complaint: Danni Tejada is a 89 y.o. female who is an acute skilled patient being seen and evaluated for weakness    HPI:  Patient continues at SNF for nursing management of IV ATB therapy. No adverse reactions noted, remains afebrile. Nursing reports no new concerns. Denies pain\discomfort.       Objective  Vital signs: 139/69,74,97%    Physical Exam  Constitutional:       General: She is not in acute distress.  Eyes:      Extraocular Movements: Extraocular movements intact.   Cardiovascular:      Rate and Rhythm: Normal rate and regular rhythm.   Pulmonary:      Effort: Pulmonary effort is normal.      Breath sounds: Normal breath sounds.   Abdominal:      General: Bowel sounds are normal.      Palpations: Abdomen is soft.   Musculoskeletal:      Cervical back: Neck supple.      Right lower leg: Edema present.      Left lower leg: Edema present.   Skin:     Comments: LLE ST- drsg in place   Neurological:      Mental Status: She is alert.      Motor: Weakness present.   Psychiatric:         Mood and Affect: Mood normal.         Behavior: Behavior is cooperative.         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  Furosemide   Monitor labs  Compression stockings           A-fib (Multi)     Monitor heart rate  Xarelto  Bleeding precautions         Hypertension     Monitor blood pressure         Sepsis (Multi)     Continue vancomycin and ceftriaxone until complete         Weakness     Continue to work towards established goals          Medications, treatments, and labs reviewed  Continue medications and treatments as listed in EMR    Salima Attestation  I, Salima Martinez    attest that this documentation has been prepared under the direction and in the presence of Afshin Diop MD.     Provider Attestation - Scribe documentation  All medical record entries made by the Scribe were at my direction and personally dictated by me. I have reviewed the chart and agree that the record accurately reflects my personal performance of the history, physical exam, discussion and plan.   Afshin Diop MD      Electronically Signed By: Afshin Diop MD   6/18/24  3:48 PM

## 2024-06-19 ENCOUNTER — NURSING HOME VISIT (OUTPATIENT)
Dept: POST ACUTE CARE | Facility: EXTERNAL LOCATION | Age: 89
End: 2024-06-19
Payer: MEDICARE

## 2024-06-19 DIAGNOSIS — R53.1 WEAKNESS: Primary | ICD-10-CM

## 2024-06-19 DIAGNOSIS — I50.9 CONGESTIVE HEART FAILURE, UNSPECIFIED HF CHRONICITY, UNSPECIFIED HEART FAILURE TYPE (MULTI): ICD-10-CM

## 2024-06-19 DIAGNOSIS — I10 HYPERTENSION, UNSPECIFIED TYPE: ICD-10-CM

## 2024-06-19 PROCEDURE — 99308 SBSQ NF CARE LOW MDM 20: CPT | Performed by: REGISTERED NURSE

## 2024-06-19 NOTE — LETTER
Patient: Danni Tejada  : 1924    Encounter Date: 2024    PROGRESS NOTE    Subjective  Chief complaint: Danni Tejada is a 89 y.o. female who is an acute skilled patient being seen and evaluated for weakness    HPI:  24 Therapy has been working with the patient to improve strength and endurance with ADLs, transfers, and mobility.  Patient continues to work toward goals.  Patient is stable and has no new complaints.  Nursing staff voices no new concerns today.    Objective  Vital signs: 132/78, 98, 77, 12, 98%    Physical Exam  Constitutional:       General: She is not in acute distress.  Eyes:      Extraocular Movements: Extraocular movements intact.   Pulmonary:      Effort: Pulmonary effort is normal.   Musculoskeletal:      Cervical back: Neck supple.   Neurological:      Mental Status: She is alert.   Psychiatric:         Mood and Affect: Mood normal.         Behavior: Behavior is cooperative.         Assessment/Plan  Problem List Items Addressed This Visit       CHF (congestive heart failure) (Multi)     Reduce sodium diet  Monitor for shortness of breath.  Furosemide   Monitor labs  Compression stockings         Hypertension     Monitor blood pressure          Weakness - Primary     Continue pt/ot           Medications, treatments, and labs reviewed  Continue medications and treatments as listed in [facility]    Scribe Attestation  Guerita YANG Scribe   attest that this documentation has been prepared under the direction and in the presence of AMAYA Gavin.    Provider Attestation - Scribe documentation  All medical record entries made by the Scribe were at my direction and personally dictated by me. I have reviewed the chart and agree that the record accurately reflects my personal performance of the history, physical exam, discussion and plan.    AMAYA Gavin        Electronically Signed By: AMAYA Gavin   24  3:36 PM

## 2024-06-20 ENCOUNTER — NURSING HOME VISIT (OUTPATIENT)
Dept: POST ACUTE CARE | Facility: EXTERNAL LOCATION | Age: 89
End: 2024-06-20
Payer: MEDICARE

## 2024-06-20 DIAGNOSIS — I48.91 ATRIAL FIBRILLATION, UNSPECIFIED TYPE (MULTI): ICD-10-CM

## 2024-06-20 DIAGNOSIS — G47.00 INSOMNIA, UNSPECIFIED TYPE: ICD-10-CM

## 2024-06-20 DIAGNOSIS — A41.9 SEPSIS, DUE TO UNSPECIFIED ORGANISM, UNSPECIFIED WHETHER ACUTE ORGAN DYSFUNCTION PRESENT (MULTI): Primary | ICD-10-CM

## 2024-06-20 DIAGNOSIS — I50.9 CONGESTIVE HEART FAILURE, UNSPECIFIED HF CHRONICITY, UNSPECIFIED HEART FAILURE TYPE (MULTI): ICD-10-CM

## 2024-06-20 DIAGNOSIS — I10 HYPERTENSION, UNSPECIFIED TYPE: ICD-10-CM

## 2024-06-20 PROCEDURE — 99308 SBSQ NF CARE LOW MDM 20: CPT | Performed by: INTERNAL MEDICINE

## 2024-06-20 NOTE — PROGRESS NOTES
PROGRESS NOTE    Subjective   Chief complaint: Danni Tejada is a 89 y.o. female who is an acute skilled patient being seen and evaluated for IV antibiotic    HPI:  HPI  Patient is currently being skilled for IV antibiotics, left shoulder infection.  Patient is tolerating antibiotics without adverse effects.  Patient reports to have difficulty sleeping and is currently on melatonin and patient reporting is ineffective.  Orders placed to discontinue melatonin and start trazodone 50 mg at bedtime.  Patient seen and examined at the bedside, appears to be in no acute distress.  Patient denies constitutional symptoms at this time.    Objective   Vital signs: 121/76, 98.3, 75, 18, 97%    Physical Exam  Constitutional:       General: She is not in acute distress.  Eyes:      Extraocular Movements: Extraocular movements intact.   Cardiovascular:      Rate and Rhythm: Normal rate and regular rhythm.   Pulmonary:      Effort: Pulmonary effort is normal.      Breath sounds: Normal breath sounds.   Abdominal:      General: Bowel sounds are normal.      Palpations: Abdomen is soft.   Musculoskeletal:      Cervical back: Neck supple.      Right lower leg: Edema present.      Left lower leg: Edema present.   Skin:     Comments: LLE ST- drsg in place   Neurological:      Mental Status: She is alert.   Psychiatric:         Mood and Affect: Mood normal.         Behavior: Behavior is cooperative.         Assessment/Plan   Problem List Items Addressed This Visit       CHF (congestive heart failure) (Multi)     Reduce sodium diet  Monitor for shortness of breath.  Furosemide   Monitor labs  Compression stockings           A-fib (Multi)     Monitor heart rate  Xarelto  Bleeding precautions         Hypertension     Monitor blood pressure         Sepsis (Multi) - Primary     Continue vancomycin and ceftriaxone until complete         Insomnia     Discontinue melatonin  Start trazodone 50 mg at bedtime          Medications, treatments,  and labs reviewed  Continue medications and treatments as listed in EMR      Scribe Attestation  I, Salima Velazquez   attest that this documentation has been prepared under the direction and in the presence of Afshin Diop MD    Provider Attestation - Scribe documentation  All medical record entries made by the Scribe were at my direction and personally dictated by me. I have reviewed the chart and agree that the record accurately reflects my personal performance of the history, physical exam, discussion and plan.   Afshin Diop MD

## 2024-06-20 NOTE — LETTER
Patient: Danni Tejada  : 1924    Encounter Date: 2024    PROGRESS NOTE    Subjective  Chief complaint: Danni Tejada is a 89 y.o. female who is an acute skilled patient being seen and evaluated for IV antibiotic    HPI:  HPI  Patient is currently being skilled for IV antibiotics, left shoulder infection.  Patient is tolerating antibiotics without adverse effects.  Patient reports to have difficulty sleeping and is currently on melatonin and patient reporting is ineffective.  Orders placed to discontinue melatonin and start trazodone 50 mg at bedtime.  Patient seen and examined at the bedside, appears to be in no acute distress.  Patient denies constitutional symptoms at this time.    Objective  Vital signs: 121/76, 98.3, 75, 18, 97%    Physical Exam  Constitutional:       General: She is not in acute distress.  Eyes:      Extraocular Movements: Extraocular movements intact.   Cardiovascular:      Rate and Rhythm: Normal rate and regular rhythm.   Pulmonary:      Effort: Pulmonary effort is normal.      Breath sounds: Normal breath sounds.   Abdominal:      General: Bowel sounds are normal.      Palpations: Abdomen is soft.   Musculoskeletal:      Cervical back: Neck supple.      Right lower leg: Edema present.      Left lower leg: Edema present.   Skin:     Comments: LLE ST- drsg in place   Neurological:      Mental Status: She is alert.   Psychiatric:         Mood and Affect: Mood normal.         Behavior: Behavior is cooperative.         Assessment/Plan  Problem List Items Addressed This Visit       CHF (congestive heart failure) (Multi)     Reduce sodium diet  Monitor for shortness of breath.  Furosemide   Monitor labs  Compression stockings           A-fib (Multi)     Monitor heart rate  Xarelto  Bleeding precautions         Hypertension     Monitor blood pressure         Sepsis (Multi) - Primary     Continue vancomycin and ceftriaxone until complete         Insomnia     Discontinue  melatonin  Start trazodone 50 mg at bedtime          Medications, treatments, and labs reviewed  Continue medications and treatments as listed in EMR      Scribe Attestation  I, Salima Velazquez   attest that this documentation has been prepared under the direction and in the presence of Afshin Diop MD    Provider Attestation - Scribe documentation  All medical record entries made by the Scribe were at my direction and personally dictated by me. I have reviewed the chart and agree that the record accurately reflects my personal performance of the history, physical exam, discussion and plan.   Afshin Diop MD        Electronically Signed By: Afshin Diop MD   6/20/24  1:36 PM

## 2024-06-21 NOTE — PROGRESS NOTES
PROGRESS NOTE    Subjective   Chief complaint: Danni Tejada is a 89 y.o. female who is an acute skilled patient being seen and evaluated for weakness    HPI:  6/19/24 Therapy has been working with the patient to improve strength and endurance with ADLs, transfers, and mobility.  Patient continues to work toward goals.  Patient is stable and has no new complaints.  Nursing staff voices no new concerns today.    Objective   Vital signs: 132/78, 98, 77, 12, 98%    Physical Exam  Constitutional:       General: She is not in acute distress.  Eyes:      Extraocular Movements: Extraocular movements intact.   Pulmonary:      Effort: Pulmonary effort is normal.   Musculoskeletal:      Cervical back: Neck supple.   Neurological:      Mental Status: She is alert.   Psychiatric:         Mood and Affect: Mood normal.         Behavior: Behavior is cooperative.         Assessment/Plan   Problem List Items Addressed This Visit       CHF (congestive heart failure) (Multi)     Reduce sodium diet  Monitor for shortness of breath.  Furosemide   Monitor labs  Compression stockings         Hypertension     Monitor blood pressure          Weakness - Primary     Continue pt/ot           Medications, treatments, and labs reviewed  Continue medications and treatments as listed in [facility]    Scribe Attestation  IGuerita Scribe   attest that this documentation has been prepared under the direction and in the presence of AMAYA Gavin.    Provider Attestation - Scribe documentation  All medical record entries made by the Scribe were at my direction and personally dictated by me. I have reviewed the chart and agree that the record accurately reflects my personal performance of the history, physical exam, discussion and plan.    AMAYA Gavin

## 2024-06-24 ENCOUNTER — NURSING HOME VISIT (OUTPATIENT)
Dept: POST ACUTE CARE | Facility: EXTERNAL LOCATION | Age: 89
End: 2024-06-24
Payer: MEDICARE

## 2024-06-24 DIAGNOSIS — I48.91 ATRIAL FIBRILLATION, UNSPECIFIED TYPE (MULTI): ICD-10-CM

## 2024-06-24 DIAGNOSIS — G47.00 INSOMNIA, UNSPECIFIED TYPE: ICD-10-CM

## 2024-06-24 DIAGNOSIS — I50.9 CONGESTIVE HEART FAILURE, UNSPECIFIED HF CHRONICITY, UNSPECIFIED HEART FAILURE TYPE (MULTI): ICD-10-CM

## 2024-06-24 DIAGNOSIS — M00.812 ARTHRITIS DUE TO OTHER BACTERIA, LEFT SHOULDER (MULTI): Primary | ICD-10-CM

## 2024-06-24 DIAGNOSIS — I10 HYPERTENSION, UNSPECIFIED TYPE: ICD-10-CM

## 2024-06-24 PROCEDURE — 99309 SBSQ NF CARE MODERATE MDM 30: CPT | Performed by: INTERNAL MEDICINE

## 2024-06-24 NOTE — LETTER
Patient: Danni Tejada  : 1924    Encounter Date: 2024    PROGRESS NOTE    Subjective  Chief complaint: Danni Tejada is a 89 y.o. female who is an acute skilled patient being seen and evaluated for weakness    HPI:  Patient remains at SNF for skilled nursing to manage IV ATB therapy. She has no adverse reactions noted, remains afebrile. Trazodone at bedtime for insomnia is effective. No new concerns per nursing.       Objective  Vital signs: 145/57,61,100%    Physical Exam  Constitutional:       General: She is not in acute distress.  Eyes:      Extraocular Movements: Extraocular movements intact.   Cardiovascular:      Rate and Rhythm: Normal rate and regular rhythm.   Pulmonary:      Effort: Pulmonary effort is normal.      Breath sounds: Normal breath sounds.   Abdominal:      General: Bowel sounds are normal.      Palpations: Abdomen is soft.   Musculoskeletal:      Cervical back: Neck supple.      Right lower leg: Edema present.      Left lower leg: Edema present.   Skin:     Comments: LLE ST- drsg in place   Neurological:      Mental Status: She is alert.   Psychiatric:         Mood and Affect: Mood normal.         Behavior: Behavior is cooperative.         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  Furosemide   Monitor labs  Compression stockings            A-fib (Multi)     Monitor heart rate  Xarelto  Bleeding precautions         Hypertension     Monitor blood pressure         Insomnia     trazodone 50 mg at bedtime          Medications, treatments, and labs reviewed  Continue medications and treatments as listed in EMR    Scribe Attestation  I, Laura Toro, Salima   attest that this documentation has been prepared under the direction and in the presence  of Afshin Diop MD.     Provider Attestation - Scribe documentation  All medical record entries made by the Scribe were at my direction and personally dictated by me. I have reviewed the chart and agree that the record accurately reflects my personal performance of the history, physical exam, discussion and plan.   Afshin Diop MD      Electronically Signed By: Afshin Diop MD   6/24/24  3:17 PM

## 2024-06-24 NOTE — PROGRESS NOTES
PROGRESS NOTE    Subjective   Chief complaint: Danni Tejada is a 89 y.o. female who is an acute skilled patient being seen and evaluated for weakness    HPI:  Patient remains at SNF for skilled nursing to manage IV ATB therapy. She has no adverse reactions noted, remains afebrile. Trazodone at bedtime for insomnia is effective. No new concerns per nursing.       Objective   Vital signs: 145/57,61,100%    Physical Exam  Constitutional:       General: She is not in acute distress.  Eyes:      Extraocular Movements: Extraocular movements intact.   Cardiovascular:      Rate and Rhythm: Normal rate and regular rhythm.   Pulmonary:      Effort: Pulmonary effort is normal.      Breath sounds: Normal breath sounds.   Abdominal:      General: Bowel sounds are normal.      Palpations: Abdomen is soft.   Musculoskeletal:      Cervical back: Neck supple.      Right lower leg: Edema present.      Left lower leg: Edema present.   Skin:     Comments: LLE ST- drsg in place   Neurological:      Mental Status: She is alert.   Psychiatric:         Mood and Affect: Mood normal.         Behavior: Behavior is cooperative.         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  Furosemide   Monitor labs  Compression stockings            A-fib (Multi)     Monitor heart rate  Xarelto  Bleeding precautions         Hypertension     Monitor blood pressure         Insomnia     trazodone 50 mg at bedtime          Medications, treatments, and labs reviewed  Continue medications and treatments as listed in EMR    Scribe Attestation  I, Salima Martinez   attest that this documentation has been prepared under the direction and in the presence of Afshin Diop MD.     Provider Attestation - Scribe  documentation  All medical record entries made by the Scribe were at my direction and personally dictated by me. I have reviewed the chart and agree that the record accurately reflects my personal performance of the history, physical exam, discussion and plan.   Afshin Diop MD

## 2024-06-25 ENCOUNTER — NURSING HOME VISIT (OUTPATIENT)
Dept: POST ACUTE CARE | Facility: EXTERNAL LOCATION | Age: 89
End: 2024-06-25
Payer: MEDICARE

## 2024-06-25 DIAGNOSIS — I48.91 ATRIAL FIBRILLATION, UNSPECIFIED TYPE (MULTI): ICD-10-CM

## 2024-06-25 DIAGNOSIS — I50.9 CONGESTIVE HEART FAILURE, UNSPECIFIED HF CHRONICITY, UNSPECIFIED HEART FAILURE TYPE (MULTI): Primary | ICD-10-CM

## 2024-06-25 DIAGNOSIS — I10 HYPERTENSION, UNSPECIFIED TYPE: ICD-10-CM

## 2024-06-25 PROCEDURE — 99308 SBSQ NF CARE LOW MDM 20: CPT | Performed by: INTERNAL MEDICINE

## 2024-06-25 NOTE — PROGRESS NOTES
PROGRESS NOTE    Subjective   Chief complaint: Danni Tejada is a 89 y.o. female who is an acute skilled patient being seen and evaluated for weakness    HPI:  Patient remains at SNF for skilled nursing. She is not in therapy. She has IV ATB for 2 more days. Denies SOB, difficulty breathing. Her lungs are clear. No new concerns.      Objective   Vital signs: 124/65,67,98%    Physical Exam  Constitutional:       General: She is not in acute distress.  Eyes:      Extraocular Movements: Extraocular movements intact.   Cardiovascular:      Rate and Rhythm: Normal rate and regular rhythm.   Pulmonary:      Effort: Pulmonary effort is normal.      Breath sounds: Normal breath sounds.   Abdominal:      General: Bowel sounds are normal.      Palpations: Abdomen is soft.   Musculoskeletal:      Cervical back: Neck supple.      Right lower leg: Edema present.      Left lower leg: Edema present.   Skin:     Comments: LLE ST- drsg in place   Neurological:      Mental Status: She is alert.   Psychiatric:         Mood and Affect: Mood normal.         Behavior: Behavior is cooperative.         Assessment/Plan   Problem List Items Addressed This Visit       CHF (congestive heart failure) (Multi) - Primary     Reduce sodium diet  Monitor for shortness of breath.  Furosemide   Monitor labs  Compression stockings            A-fib (Multi)     Monitor heart rate  Xarelto  Bleeding precautions         Hypertension     Monitor blood pressure          Medications, treatments, and labs reviewed  Continue medications and treatments as listed in EMR    Scribe Attestation  I, Salima Martinez   attest that this documentation has been prepared under the direction and in the presence of fAshin Diop MD.     Provider Attestation - Scribe documentation  All medical record entries made by the Scribe were at my direction and personally dictated by me. I have reviewed the chart and agree that the record accurately reflects my personal  performance of the history, physical exam, discussion and plan.   Afshin Diop MD

## 2024-06-25 NOTE — LETTER
Patient: Danni Tejada  : 1924    Encounter Date: 2024    PROGRESS NOTE    Subjective  Chief complaint: Danni Tejada is a 89 y.o. female who is an acute skilled patient being seen and evaluated for weakness    HPI:  Patient remains at SNF for skilled nursing. She is not in therapy. She has IV ATB for 2 more days. Denies SOB, difficulty breathing. Her lungs are clear. No new concerns.      Objective  Vital signs: 124/65,67,98%    Physical Exam  Constitutional:       General: She is not in acute distress.  Eyes:      Extraocular Movements: Extraocular movements intact.   Cardiovascular:      Rate and Rhythm: Normal rate and regular rhythm.   Pulmonary:      Effort: Pulmonary effort is normal.      Breath sounds: Normal breath sounds.   Abdominal:      General: Bowel sounds are normal.      Palpations: Abdomen is soft.   Musculoskeletal:      Cervical back: Neck supple.      Right lower leg: Edema present.      Left lower leg: Edema present.   Skin:     Comments: LLE ST- drsg in place   Neurological:      Mental Status: She is alert.   Psychiatric:         Mood and Affect: Mood normal.         Behavior: Behavior is cooperative.         Assessment/Plan  Problem List Items Addressed This Visit       CHF (congestive heart failure) (Multi) - Primary     Reduce sodium diet  Monitor for shortness of breath.  Furosemide   Monitor labs  Compression stockings            A-fib (Multi)     Monitor heart rate  Xarelto  Bleeding precautions         Hypertension     Monitor blood pressure          Medications, treatments, and labs reviewed  Continue medications and treatments as listed in EMR    Scribe Attestation  Laura YANG Scribe   attest that this documentation has been prepared under the direction and in the presence of Afshin Diop MD.     Provider Attestation - Scribe documentation  All medical record entries made by the Scribe were at my direction and personally dictated by me. I have reviewed  the chart and agree that the record accurately reflects my personal performance of the history, physical exam, discussion and plan.   Afshin Diop MD      Electronically Signed By: Afshin Diop MD   6/25/24  4:13 PM

## 2024-06-27 ENCOUNTER — NURSING HOME VISIT (OUTPATIENT)
Dept: POST ACUTE CARE | Facility: EXTERNAL LOCATION | Age: 89
End: 2024-06-27
Payer: MEDICARE

## 2024-06-27 DIAGNOSIS — I50.9 CONGESTIVE HEART FAILURE, UNSPECIFIED HF CHRONICITY, UNSPECIFIED HEART FAILURE TYPE (MULTI): ICD-10-CM

## 2024-06-27 DIAGNOSIS — A41.9 SEPSIS, DUE TO UNSPECIFIED ORGANISM, UNSPECIFIED WHETHER ACUTE ORGAN DYSFUNCTION PRESENT (MULTI): Primary | ICD-10-CM

## 2024-06-27 DIAGNOSIS — M00.812 ARTHRITIS DUE TO OTHER BACTERIA, LEFT SHOULDER (MULTI): ICD-10-CM

## 2024-06-27 PROCEDURE — 99308 SBSQ NF CARE LOW MDM 20: CPT | Performed by: INTERNAL MEDICINE

## 2024-06-27 NOTE — PROGRESS NOTES
PROGRESS NOTE    Subjective   Chief complaint: Danni Tejada is a 89 y.o. female who is an acute skilled patient being seen and evaluated for IV antibiotics    HPI:  HPI  Patient is seen in follow-up of IV antibiotics, for sepsis, left shoulder.  Patient tolerating antibiotics.  Patient's last day for antibiotic is today and patient will follow-up with ID tomorrow.  Patient likely will discharge home this coming weekend.  Patient seen and examined at the bedside, appears to be in no acute distress.  Patient is afebrile.  Pain is currently controlled.    Objective   Vital signs: 154/76, 78, 17, 96%    Physical Exam  Constitutional:       General: She is not in acute distress.  Eyes:      Extraocular Movements: Extraocular movements intact.   Cardiovascular:      Rate and Rhythm: Normal rate and regular rhythm.   Pulmonary:      Effort: Pulmonary effort is normal.      Breath sounds: Normal breath sounds.   Abdominal:      General: Bowel sounds are normal.      Palpations: Abdomen is soft.   Musculoskeletal:      Cervical back: Neck supple.      Right lower leg: Edema present.      Left lower leg: Edema present.   Skin:     Comments: LLE ST- drsg in place   Neurological:      Mental Status: She is alert.   Psychiatric:         Mood and Affect: Mood normal.         Behavior: Behavior is cooperative.         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  Furosemide   Monitor labs  Compression stockings           Sepsis (Multi) - Primary     Continue vancomycin and ceftriaxone through today          Medications, treatments, and labs reviewed  Continue medications and treatments as listed in EMR      Scribe Attestation  I, Salima Velazquez   attest that this documentation has been prepared under  the direction and in the presence of Afshin Diop MD    Provider Attestation - Scribe documentation  All medical record entries made by the Scribe were at my direction and personally dictated by me. I have reviewed the chart and agree that the record accurately reflects my personal performance of the history, physical exam, discussion and plan.   Afshin Diop MD

## 2024-06-27 NOTE — LETTER
Patient: Danni Tejada  : 1924    Encounter Date: 2024    PROGRESS NOTE    Subjective  Chief complaint: Danni Tejada is a 89 y.o. female who is an acute skilled patient being seen and evaluated for IV antibiotics    HPI:  HPI  Patient is seen in follow-up of IV antibiotics, for sepsis, left shoulder.  Patient tolerating antibiotics.  Patient's last day for antibiotic is today and patient will follow-up with ID tomorrow.  Patient likely will discharge home this coming weekend.  Patient seen and examined at the bedside, appears to be in no acute distress.  Patient is afebrile.  Pain is currently controlled.    Objective  Vital signs: 154/76, 78, 17, 96%    Physical Exam  Constitutional:       General: She is not in acute distress.  Eyes:      Extraocular Movements: Extraocular movements intact.   Cardiovascular:      Rate and Rhythm: Normal rate and regular rhythm.   Pulmonary:      Effort: Pulmonary effort is normal.      Breath sounds: Normal breath sounds.   Abdominal:      General: Bowel sounds are normal.      Palpations: Abdomen is soft.   Musculoskeletal:      Cervical back: Neck supple.      Right lower leg: Edema present.      Left lower leg: Edema present.   Skin:     Comments: LLE ST- drsg in place   Neurological:      Mental Status: She is alert.   Psychiatric:         Mood and Affect: Mood normal.         Behavior: Behavior is cooperative.         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  Furosemide   Monitor labs  Compression stockings           Sepsis (Multi) - Primary     Continue vancomycin and ceftriaxone through today          Medications, treatments, and labs reviewed  Continue medications and treatments as listed in EMR      Scribe Attestation  ICarol  Salima Knight   attest that this documentation has been prepared under the direction and in the presence of Afshin Diop MD    Provider Attestation - Scribe documentation  All medical record entries made by the Scribe were at my direction and personally dictated by me. I have reviewed the chart and agree that the record accurately reflects my personal performance of the history, physical exam, discussion and plan.   Afshin Diop MD        Electronically Signed By: Afshin Diop MD   6/27/24  7:34 PM

## 2024-06-28 ENCOUNTER — NURSING HOME VISIT (OUTPATIENT)
Dept: POST ACUTE CARE | Facility: EXTERNAL LOCATION | Age: 89
End: 2024-06-28
Payer: MEDICARE

## 2024-06-28 DIAGNOSIS — I50.9 CONGESTIVE HEART FAILURE, UNSPECIFIED HF CHRONICITY, UNSPECIFIED HEART FAILURE TYPE (MULTI): ICD-10-CM

## 2024-06-28 DIAGNOSIS — M00.812 ARTHRITIS DUE TO OTHER BACTERIA, LEFT SHOULDER (MULTI): ICD-10-CM

## 2024-06-28 DIAGNOSIS — R53.1 WEAKNESS: Primary | ICD-10-CM

## 2024-06-28 PROCEDURE — 99308 SBSQ NF CARE LOW MDM 20: CPT | Performed by: REGISTERED NURSE

## 2024-06-28 NOTE — LETTER
Patient: Danni Tejada  : 1924    Encounter Date: 2024    PROGRESS NOTE    Subjective  Chief complaint: Danni Tejada is a 89 y.o. female who is an acute skilled patient being seen and evaluated for weakness    HPI:  24 Patient seen and examined at bedside.  Patient denies n/v/f/c.  Continues working in therapy.  No new complaints.    Objective  Vital signs: 158/59, 97.7, 70, 16, 97%    Physical Exam  Constitutional:       General: She is not in acute distress.  Eyes:      Extraocular Movements: Extraocular movements intact.   Pulmonary:      Effort: Pulmonary effort is normal.   Musculoskeletal:      Cervical back: Neck supple.   Neurological:      Mental Status: She is alert.   Psychiatric:         Mood and Affect: Mood normal.         Behavior: Behavior is cooperative.         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  Furosemide   Monitor labs  Compression stockings         Weakness - Primary     Continue pt/ot           Medications, treatments, and labs reviewed  Continue medications and treatments as listed in [facility]    Scribe Attestation  IGuerita Scribe   attest that this documentation has been prepared under the direction and in the presence of AMAYA Gavin.    Provider Attestation - Scribe documentation  All medical record entries made by the Scribe were at my direction and personally dictated by me. I have reviewed the chart and agree that the record accurately reflects my personal performance of the history, physical exam, discussion and plan.    AMAYA Gavin        Electronically Signed By: AMAYA Gavin   24  2:14 PM

## 2024-07-01 ENCOUNTER — NURSING HOME VISIT (OUTPATIENT)
Dept: POST ACUTE CARE | Facility: EXTERNAL LOCATION | Age: 89
End: 2024-07-01
Payer: MEDICARE

## 2024-07-01 DIAGNOSIS — I48.91 ATRIAL FIBRILLATION, UNSPECIFIED TYPE (MULTI): ICD-10-CM

## 2024-07-01 DIAGNOSIS — I10 HYPERTENSION, UNSPECIFIED TYPE: ICD-10-CM

## 2024-07-01 DIAGNOSIS — I50.9 CONGESTIVE HEART FAILURE, UNSPECIFIED HF CHRONICITY, UNSPECIFIED HEART FAILURE TYPE (MULTI): ICD-10-CM

## 2024-07-01 DIAGNOSIS — M00.812 ARTHRITIS DUE TO OTHER BACTERIA, LEFT SHOULDER (MULTI): ICD-10-CM

## 2024-07-01 DIAGNOSIS — R53.1 WEAKNESS: Primary | ICD-10-CM

## 2024-07-01 PROCEDURE — 99315 NF DSCHRG MGMT 30 MIN/LESS: CPT | Performed by: INTERNAL MEDICINE

## 2024-07-01 NOTE — PROGRESS NOTES
PROGRESS NOTE    Subjective   Chief complaint: Danni Tejada is a 89 y.o. female who is an acute skilled patient being seen and evaluated for weakness    HPI:  Patient presents for general medical care and f/u.  Patient seen and examined at bedside. HTN, denies fatigue, sob, and palpitations. CHF, no changes in weight or SOB. Afib, denies palpitation or chest pain. She continues on PO ATB therapy with no adverse reactions noted. She is discharging home today. She is stable with no issues per nursing.  Patient has no acute complaints.      Objective   Vital signs: 158/59,70,97%    Physical Exam  Constitutional:       General: She is not in acute distress.  Eyes:      Extraocular Movements: Extraocular movements intact.   Cardiovascular:      Rate and Rhythm: Normal rate and regular rhythm.   Pulmonary:      Effort: Pulmonary effort is normal.      Breath sounds: Normal breath sounds.   Abdominal:      General: Bowel sounds are normal.      Palpations: Abdomen is soft.   Musculoskeletal:      Cervical back: Neck supple.      Right lower leg: Edema present.      Left lower leg: Edema present.   Skin:     Comments: LLE ST- drsg in place   Neurological:      Mental Status: She is alert.   Psychiatric:         Mood and Affect: Mood normal.         Behavior: Behavior is cooperative.         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  Furosemide   Monitor labs  Compression stockings         A-fib (Multi)     Monitor heart rate  Xarelto  Bleeding precautions         Hypertension     Monitor blood pressure          Weakness - Primary     Continue pt/ot           Medications, treatments, and labs reviewed  Continue medications and treatments as listed in EMR  Prognosis - fair  Course -  therapy  Plan - MT home with [facility]   Follow up with PCP within 2 weeks  Medications called into pharmacy by office  Condition at discharge is stable    Scribe Attestation  I, Salima Martinez   attest that this documentation has been prepared under the direction and in the presence of Afshin Diop MD.     Provider Attestation - Scribe documentation  All medical record entries made by the Scribe were at my direction and personally dictated by me. I have reviewed the chart and agree that the record accurately reflects my personal performance of the history, physical exam, discussion and plan.   Afshin Diop MD

## 2024-07-01 NOTE — LETTER
Patient: Danni Tejada  : 1924    Encounter Date: 2024    PROGRESS NOTE    Subjective  Chief complaint: Danni Tejada is a 89 y.o. female who is an acute skilled patient being seen and evaluated for weakness    HPI:  Patient presents for general medical care and f/u.  Patient seen and examined at bedside. HTN, denies fatigue, sob, and palpitations. CHF, no changes in weight or SOB. Afib, denies palpitation or chest pain. She continues on PO ATB therapy with no adverse reactions noted. She is discharging home today. She is stable with no issues per nursing.  Patient has no acute complaints.      Objective  Vital signs: 158/59,70,97%    Physical Exam  Constitutional:       General: She is not in acute distress.  Eyes:      Extraocular Movements: Extraocular movements intact.   Cardiovascular:      Rate and Rhythm: Normal rate and regular rhythm.   Pulmonary:      Effort: Pulmonary effort is normal.      Breath sounds: Normal breath sounds.   Abdominal:      General: Bowel sounds are normal.      Palpations: Abdomen is soft.   Musculoskeletal:      Cervical back: Neck supple.      Right lower leg: Edema present.      Left lower leg: Edema present.   Skin:     Comments: LLE ST- drsg in place   Neurological:      Mental Status: She is alert.   Psychiatric:         Mood and Affect: Mood normal.         Behavior: Behavior is cooperative.         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  Furosemide   Monitor labs  Compression stockings         A-fib (Multi)     Monitor heart rate  Xarelto  Bleeding precautions         Hypertension     Monitor blood pressure          Weakness - Primary     Continue pt/ot           Medications, treatments, and labs reviewed  Continue medications and  treatments as listed in EMR  Prognosis - fair  Course - therapy  Plan - DC home with [facility]   Follow up with PCP within 2 weeks  Medications called into pharmacy by office  Condition at discharge is stable    Scribe Attestation  I, Salima Martinez   attest that this documentation has been prepared under the direction and in the presence of Afshin Diop MD.     Provider Attestation - Scribe documentation  All medical record entries made by the Scribe were at my direction and personally dictated by me. I have reviewed the chart and agree that the record accurately reflects my personal performance of the history, physical exam, discussion and plan.   Afshin Diop MD      Electronically Signed By: Afshin Diop MD   7/1/24  3:39 PM

## 2024-07-01 NOTE — ASSESSMENT & PLAN NOTE
Reduce sodium diet  Monitor for shortness of breath.  Furosemide   Monitor labs  Compression stockings   Stable

## 2024-07-23 NOTE — PROGRESS NOTES
PROGRESS NOTE    Subjective   Chief complaint: Danni Tejada is a 89 y.o. female who is an acute skilled patient being seen and evaluated for weakness    HPI:  6/28/24 Patient seen and examined at bedside.  Patient denies n/v/f/c.  Continues working in therapy.  No new complaints.    Objective   Vital signs: 158/59, 97.7, 70, 16, 97%    Physical Exam  Constitutional:       General: She is not in acute distress.  Eyes:      Extraocular Movements: Extraocular movements intact.   Pulmonary:      Effort: Pulmonary effort is normal.   Musculoskeletal:      Cervical back: Neck supple.   Neurological:      Mental Status: She is alert.   Psychiatric:         Mood and Affect: Mood normal.         Behavior: Behavior is cooperative.         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  Furosemide   Monitor labs  Compression stockings         Weakness - Primary     Continue pt/ot           Medications, treatments, and labs reviewed  Continue medications and treatments as listed in [facility]    Scribe Attestation  IGuerita Scribe   attest that this documentation has been prepared under the direction and in the presence of AMAYA Gavin.    Provider Attestation - Scribe documentation  All medical record entries made by the Scribe were at my direction and personally dictated by me. I have reviewed the chart and agree that the record accurately reflects my personal performance of the history, physical exam, discussion and plan.    ROSS GavinCNP